# Patient Record
Sex: MALE | Race: WHITE | URBAN - METROPOLITAN AREA
[De-identification: names, ages, dates, MRNs, and addresses within clinical notes are randomized per-mention and may not be internally consistent; named-entity substitution may affect disease eponyms.]

---

## 2017-08-16 ENCOUNTER — ED HISTORICAL/CONVERTED ENCOUNTER (OUTPATIENT)
Dept: OTHER | Age: 32
End: 2017-08-16

## 2019-06-21 ENCOUNTER — ED HISTORICAL/CONVERTED ENCOUNTER (OUTPATIENT)
Dept: OTHER | Age: 34
End: 2019-06-21

## 2019-11-05 ENCOUNTER — ED HISTORICAL/CONVERTED ENCOUNTER (OUTPATIENT)
Dept: OTHER | Age: 34
End: 2019-11-05

## 2023-04-13 ENCOUNTER — HOSPITAL ENCOUNTER (EMERGENCY)
Age: 38
Discharge: HOME | End: 2023-04-13
Attending: EMERGENCY MEDICINE
Payer: MEDICAID

## 2023-04-13 VITALS
BODY MASS INDEX: 25.67 KG/M2 | TEMPERATURE: 98.1 F | OXYGEN SATURATION: 96 % | WEIGHT: 200 LBS | HEART RATE: 110 BPM | DIASTOLIC BLOOD PRESSURE: 66 MMHG | RESPIRATION RATE: 20 BRPM | HEIGHT: 74 IN | SYSTOLIC BLOOD PRESSURE: 107 MMHG

## 2023-04-13 DIAGNOSIS — R56.9 SEIZURE (HCC): Primary | ICD-10-CM

## 2023-04-13 LAB
ALBUMIN SERPL-MCNC: 3.9 G/DL (ref 3.5–5)
ALBUMIN/GLOB SERPL: 1 (ref 1.1–2.2)
ALP SERPL-CCNC: 89 U/L (ref 45–117)
ALT SERPL-CCNC: 68 U/L (ref 12–78)
ANION GAP SERPL CALC-SCNC: 28 MMOL/L (ref 5–15)
AST SERPL W P-5'-P-CCNC: 72 U/L (ref 15–37)
BASE DEFICIT BLDV-SCNC: 3.7 MMOL/L
BASOPHILS # BLD: 0.1 K/UL (ref 0–0.1)
BASOPHILS NFR BLD: 1 % (ref 0–1)
BILIRUB SERPL-MCNC: 0.9 MG/DL (ref 0.2–1)
BUN SERPL-MCNC: 16 MG/DL (ref 6–20)
BUN/CREAT SERPL: 11 (ref 12–20)
CA-I BLD-MCNC: 9.9 MG/DL (ref 8.5–10.1)
CHLORIDE SERPL-SCNC: 93 MMOL/L (ref 97–108)
CO2 SERPL-SCNC: 12 MMOL/L (ref 21–32)
CREAT SERPL-MCNC: 1.4 MG/DL (ref 0.7–1.3)
DIFFERENTIAL METHOD BLD: ABNORMAL
EOSINOPHIL # BLD: 0.1 K/UL (ref 0–0.4)
EOSINOPHIL NFR BLD: 1 % (ref 0–7)
ERYTHROCYTE [DISTWIDTH] IN BLOOD BY AUTOMATED COUNT: 12.5 % (ref 11.5–14.5)
GLOBULIN SER CALC-MCNC: 4 G/DL (ref 2–4)
GLUCOSE SERPL-MCNC: 107 MG/DL (ref 65–100)
HCO3 BLDV-SCNC: 21.3 MMOL/L (ref 22–26)
HCT VFR BLD AUTO: 46.8 % (ref 36.6–50.3)
HGB BLD-MCNC: 15.8 G/DL (ref 12.1–17)
IMM GRANULOCYTES # BLD AUTO: 0.1 K/UL (ref 0–0.04)
IMM GRANULOCYTES NFR BLD AUTO: 1 % (ref 0–0.5)
LACTATE SERPL-SCNC: 4.2 MMOL/L (ref 0.4–2)
LYMPHOCYTES # BLD: 2.5 K/UL (ref 0.8–3.5)
LYMPHOCYTES NFR BLD: 23 % (ref 12–49)
MCH RBC QN AUTO: 35.4 PG (ref 26–34)
MCHC RBC AUTO-ENTMCNC: 33.8 G/DL (ref 30–36.5)
MCV RBC AUTO: 104.9 FL (ref 80–99)
MONOCYTES # BLD: 1 K/UL (ref 0–1)
MONOCYTES NFR BLD: 9 % (ref 5–13)
NEUTS SEG # BLD: 7.2 K/UL (ref 1.8–8)
NEUTS SEG NFR BLD: 65 % (ref 32–75)
PCO2 BLDV: 37.6 MMHG (ref 41–52)
PERFORMED BY, TECHID: ABNORMAL
PH BLDV: 7.36 (ref 7.23–7.44)
PLATELET # BLD AUTO: 138 K/UL (ref 150–400)
PMV BLD AUTO: 10.4 FL (ref 8.9–12.9)
PO2 BLDV: 28 MMHG (ref 25–40)
POTASSIUM SERPL-SCNC: 3.4 MMOL/L (ref 3.5–5.1)
PROT SERPL-MCNC: 7.9 G/DL (ref 6.4–8.2)
RBC # BLD AUTO: 4.46 M/UL (ref 4.1–5.7)
SAO2 % BLDV: 51.6 %
SODIUM SERPL-SCNC: 133 MMOL/L (ref 136–145)
SPECIMEN TYPE: ABNORMAL
WBC # BLD AUTO: 11 K/UL (ref 4.1–11.1)

## 2023-04-13 PROCEDURE — 74011250636 HC RX REV CODE- 250/636: Performed by: EMERGENCY MEDICINE

## 2023-04-13 PROCEDURE — 74011250637 HC RX REV CODE- 250/637: Performed by: EMERGENCY MEDICINE

## 2023-04-13 PROCEDURE — 93005 ELECTROCARDIOGRAM TRACING: CPT

## 2023-04-13 PROCEDURE — 83605 ASSAY OF LACTIC ACID: CPT

## 2023-04-13 PROCEDURE — 80053 COMPREHEN METABOLIC PANEL: CPT

## 2023-04-13 PROCEDURE — 85025 COMPLETE CBC W/AUTO DIFF WBC: CPT

## 2023-04-13 PROCEDURE — 99284 EMERGENCY DEPT VISIT MOD MDM: CPT

## 2023-04-13 PROCEDURE — 36415 COLL VENOUS BLD VENIPUNCTURE: CPT

## 2023-04-13 PROCEDURE — 96374 THER/PROPH/DIAG INJ IV PUSH: CPT

## 2023-04-13 PROCEDURE — 96361 HYDRATE IV INFUSION ADD-ON: CPT

## 2023-04-13 RX ORDER — PHENYTOIN 50 MG/1
100 TABLET, CHEWABLE ORAL 3 TIMES DAILY
Qty: 180 TABLET | Refills: 2 | Status: SHIPPED | OUTPATIENT
Start: 2023-04-13 | End: 2023-05-13

## 2023-04-13 RX ORDER — IBUPROFEN 600 MG/1
600 TABLET ORAL
Status: DISCONTINUED | OUTPATIENT
Start: 2023-04-13 | End: 2023-04-13 | Stop reason: HOSPADM

## 2023-04-13 RX ORDER — LEVETIRACETAM 500 MG/5ML
1000 INJECTION, SOLUTION, CONCENTRATE INTRAVENOUS EVERY 12 HOURS
Status: DISCONTINUED | OUTPATIENT
Start: 2023-04-13 | End: 2023-04-13 | Stop reason: HOSPADM

## 2023-04-13 RX ADMIN — SODIUM CHLORIDE 1000 ML: 9 INJECTION, SOLUTION INTRAVENOUS at 13:39

## 2023-04-13 RX ADMIN — SODIUM CHLORIDE 1000 ML: 9 INJECTION, SOLUTION INTRAVENOUS at 15:04

## 2023-04-13 RX ADMIN — IBUPROFEN 600 MG: 600 TABLET, FILM COATED ORAL at 16:09

## 2023-04-13 RX ADMIN — LEVETIRACETAM 1000 MG: 100 INJECTION, SOLUTION INTRAVENOUS at 13:56

## 2023-04-13 NOTE — ED PROVIDER NOTES
[facility] EMERGENCY DEPARTMENT  EMERGENCY DEPARTMENT HISTORY AND PHYSICAL EXAM      Date: 4/13/2023  Patient Name: Geri Perales  MRN: [id number]  [place] 1985  Date of evaluation: 4/13/2023  Provider: Kacy Perez DO   Note Started: 1:01 PM 4/13/23    History of Presenting Illness     Chief Complaint   Patient presents with    Seizure     History Provided By: Patient and EMS    HPI: Geri Perales is a 42 y.o. male with history of seizure who presents with seizure-like activity. States he was at work. He was in a car with coworkers. He does not remember what happened after that. He was told by his coworkers that he had a 2-minute seizure. Patient feeling well now. No fevers. He is supposed be on Dilantin but does not have the prescription and has not been taking it. Past History     Past Medical History:  Seizures    Past Surgical History:  History reviewed. No pertinent surgical history. Family History:  History reviewed. No pertinent family history. Social History:  Social History     Tobacco Use    Smoking status: Every Day     Packs/day: 0.50     Types: Cigarettes    Smokeless tobacco: Never   Substance Use Topics    Alcohol use: Yes     Comment: beer    Drug use: Yes     Types: Marijuana     Comment: daily       Allergies:  No Known Allergies    PCP: None    Current Meds:   Previous Medications    No medications on file       Physical Exam   Vitals  ED Triage Vitals   ED Encounter Vitals Group      BP       Pulse       Resp       Temp       Temp src       SpO2       Weight       Height       Exam  Constitutional: No acute distress. Well-nourished. Skin: No rash. ENT: No rhinorrhea. No cough. Head is normocephalic and atraumatic. Eye: No proptosis or conjunctival injections. Respiratory: No apparent respiratory distress. Cardiac: Tachycardic with regular rhythm. 2+ radial pulses. No murmurs. Neurological: No focal deficits. Gastrointestinal: Nondistended.   Musculoskeletal: No obvious bony deformities. Psychiatric: Cooperative. Appropriate mood and affect. SCREENINGS               No data recorded        Lab and Diagnostic Study Results   Labs -     Recent Results (from the past 12 hour(s))   METABOLIC PANEL, COMPREHENSIVE    Collection Time: 04/13/23  1:20 PM   Result Value Ref Range    Sodium 133 (L) 136 - 145 mmol/L    Potassium 3.4 (L) 3.5 - 5.1 mmol/L    Chloride 93 (L) 97 - 108 mmol/L    CO2 12 (LL) 21 - 32 mmol/L    Anion gap 28 (H) 5 - 15 mmol/L    Glucose 107 (H) 65 - 100 mg/dL    BUN 16 6 - 20 mg/dL    Creatinine 1.40 (H) 0.70 - 1.30 mg/dL    BUN/Creatinine ratio 11 (L) 12 - 20      eGFR >60 >60 ml/min/1.73m2    Calcium 9.9 8.5 - 10.1 mg/dL    Bilirubin, total 0.9 0.2 - 1.0 mg/dL    AST (SGOT) 72 (H) 15 - 37 U/L    ALT (SGPT) 68 12 - 78 U/L    Alk. phosphatase 89 45 - 117 U/L    Protein, total 7.9 6.4 - 8.2 g/dL    Albumin 3.9 3.5 - 5.0 g/dL    Globulin 4.0 2.0 - 4.0 g/dL    A-G Ratio 1.0 (L) 1.1 - 2.2     CBC WITH AUTOMATED DIFF    Collection Time: 04/13/23  1:20 PM   Result Value Ref Range    WBC 11.0 4.1 - 11.1 K/uL    RBC 4.46 4.10 - 5.70 M/uL    HGB 15.8 12.1 - 17.0 g/dL    HCT 46.8 36.6 - 50.3 %    .9 (H) 80.0 - 99.0 FL    MCH 35.4 (H) 26.0 - 34.0 PG    MCHC 33.8 30.0 - 36.5 g/dL    RDW 12.5 11.5 - 14.5 %    PLATELET 444 (L) 678 - 400 K/uL    MPV 10.4 8.9 - 12.9 FL    NEUTROPHILS 65 32 - 75 %    LYMPHOCYTES 23 12 - 49 %    MONOCYTES 9 5 - 13 %    EOSINOPHILS 1 0 - 7 %    BASOPHILS 1 0 - 1 %    IMMATURE GRANULOCYTES 1 (H) 0.0 - 0.5 %    ABS. NEUTROPHILS 7.2 1.8 - 8.0 K/UL    ABS. LYMPHOCYTES 2.5 0.8 - 3.5 K/UL    ABS. MONOCYTES 1.0 0.0 - 1.0 K/UL    ABS. EOSINOPHILS 0.1 0.0 - 0.4 K/UL    ABS. BASOPHILS 0.1 0.0 - 0.1 K/UL    ABS. IMM.  GRANS. 0.1 (H) 0.00 - 0.04 K/UL    DF AUTOMATED     LACTIC ACID    Collection Time: 04/13/23  2:50 PM   Result Value Ref Range    Lactic acid 4.2 (HH) 0.4 - 2.0 mmol/L   POC VENOUS BLOOD GAS    Collection Time: 04/13/23  3:34 PM Result Value Ref Range    pH, venous (POC) 7.36 7.23 - 7.44      pCO2, venous (POC) 37.6 (L) 41.0 - 52.0 mmHg    pO2, venous (POC) 28 25 - 40 mmHg    HCO3, venous (POC) 21.3 (L) 22.0 - 26.0 mmol/L    sO2, venous (POC) 51.6 %    Base deficit, venous (POC) 3.7 mmol/L    Specimen type (POC) Venous      Performed by Chrystal Casillas        EKG: Initial EKG interpreted by me. Interpretation: Obtained on 4/13/2023 at 1335. Read at 1340. Sinus tachycardia at rate of 104 bpm.  Normal NC interval, QRS duration, QTc interval.  No ST segment abnormalities. Normal axis. Radiologic Studies:  Non-plain film images such as CT, Ultrasound and MRI are read by the radiologist. Plain radiographic images are visualized and preliminarily interpreted by the ED Provider with the below findings:  N/a    Interpretation per the radiologist below, if available at the time of this note:  No results found. MDM (EMERGENCY DEPARTMENT COURSE and DIFFERENTIAL DIAGNOSIS)   Vitals:    Vitals:    04/13/23 1303 04/13/23 1601   BP: (!) 87/62 107/66   Pulse: (!) 125 (!) 110   Resp: 20    Temp: 98.1 °F (36.7 °C)    SpO2: 91% 96%   Weight: 90.7 kg (200 lb)    Height: 6' 2\" (1.88 m)         Patient was given the following medications:  Medications   levETIRAcetam (KEPPRA) injection 1,000 mg (1,000 mg IntraVENous Given 4/13/23 1356)   ibuprofen (MOTRIN) tablet 600 mg (600 mg Oral Given 4/13/23 1609)   sodium chloride 0.9 % bolus infusion 1,000 mL (0 mL IntraVENous IV Completed 4/13/23 1500)   sodium chloride 0.9 % bolus infusion 1,000 mL (0 mL IntraVENous IV Completed 4/13/23 1548)       CONSULTS: (who and what was discussed)  None       Social Determinants affecting Dx or Tx:   Counseling:     Records Reviewed (source and summary of external notes): Nursing notes. CC/HPI Summary: Seizure-like activity  DDx: Seizure, syncope, orthostatic hypotension, arrhythmia  ED Course and Reassessment:   Patient likely had a seizure based on history.   I have ordered 1 g of Keppra IV and a liter of normal saline. I am concerned about his vital signs. He is tachycardic and hypotensive. This likely could be from dehydration. He does work outside. Currently not concerned about sepsis. ED Course as of 04/13/23 1614   Thu Apr 13, 2023   1444 CO2 is 12. I am concerned about this number. I have ordered a lactic acid venous blood gas as well as another liter of normal saline. [SW]      ED Course User Index  [SW] Josué NORIEGA, DO     Patient pH is appropriate. Lactic acid is 4 however he did likely have a seizure with staff explaining this elevation. Heart rate is 110 currently after liter of normal saline. He was given a gram of Keppra and does not have any seizure activity while he is here. I feel it is safe to discharge this patient home. I am not concerned about sepsis clinically. Blood pressure has improved significantly with IV fluid. He did have some back pain which is likely related to muscle spasms from seizure. He was given Motrin for this. No red flag signs and I feel no need for any further work-up at this time. CRITICAL CARE TIME   CRITICAL CARE NOTE :  CRITICAL CARE:  I personally spent a total of 60 minutes of critical care time in obtaining history, performing a physical exam, bedside monitoring of interventions, collecting and interpreting tests but excluding time spent performing procedures, treating other patients and teaching time. Discussion with consultant: n/a. Clinical Concern: hypotension. Intervention: iv fluids  Vanita [place] D.O. Mar Hernandez,      Disposition/Plan   Disposition: Discharged    DISCHARGE PLAN:  1. There are no discharge medications for this patient.     2.   Follow-up Information       Follow up With Specialties Details Why Contact Info    [address] [facility] Emergency Medicine Go today As soon as possible if symptoms worsen [address]  [phone] 3.  Return to ED if worse   4. Current Discharge Medication List        START taking these medications    Details   phenytoin (DILANTIN) 50 mg chewable tablet Take 2 Tablets by mouth three (3) times daily for 30 days. Qty: 180 Tablet, Refills: 2  Start date: 4/13/2023, End date: 5/13/2023              PATIENT REFERRED TO:  Follow-up Information       Follow up With Specialties Details Why Contact Info    [address] [place] [address] Emergency Medicine Go today As soon as possible if symptoms worsen [address] [place] [id number] [phone]             DISCONTINUED MEDICATIONS:  Current Discharge Medication List        Clinical Impression   Clinical Impression:   1. Seizure ([facility] [address]           Lucy Mancuso DO    I am the Primary Clinician of Record. Lucy Mancuso DO (electronically signed)    (Please note that parts of this dictation were completed with voice recognition software. Quite often unanticipated grammatical, syntax, homophones, and other interpretive errors are inadvertently transcribed by the computer software. Please disregards these errors.  Please excuse any errors that have escaped final proofreading.)

## 2023-04-13 NOTE — ED TRIAGE NOTES
Pt has hx of seizures, had a witnessed seizure for approx 2 min per friends. Pt has had a few beers and some weed today.

## 2023-04-14 LAB
ATRIAL RATE: 104 BPM
CALCULATED P AXIS, ECG09: 52 DEGREES
CALCULATED R AXIS, ECG10: 37 DEGREES
CALCULATED T AXIS, ECG11: 48 DEGREES
DIAGNOSIS, 93000: NORMAL
P-R INTERVAL, ECG05: 148 MS
Q-T INTERVAL, ECG07: 370 MS
QRS DURATION, ECG06: 86 MS
QTC CALCULATION (BEZET), ECG08: 486 MS
VENTRICULAR RATE, ECG03: 104 BPM

## 2023-11-06 ENCOUNTER — HOSPITAL ENCOUNTER (EMERGENCY)
Facility: HOSPITAL | Age: 38
Discharge: HOME | End: 2023-11-06
Payer: MEDICAID

## 2023-11-06 ENCOUNTER — APPOINTMENT (OUTPATIENT)
Facility: HOSPITAL | Age: 38
End: 2023-11-06
Payer: MEDICAID

## 2023-11-06 VITALS
BODY MASS INDEX: 27.83 KG/M2 | WEIGHT: 210 LBS | OXYGEN SATURATION: 95 % | HEIGHT: 73 IN | RESPIRATION RATE: 17 BRPM | TEMPERATURE: 98 F | HEART RATE: 82 BPM | DIASTOLIC BLOOD PRESSURE: 89 MMHG | SYSTOLIC BLOOD PRESSURE: 149 MMHG

## 2023-11-06 DIAGNOSIS — S61.210A LACERATION OF RIGHT INDEX FINGER WITHOUT FOREIGN BODY WITHOUT DAMAGE TO NAIL, INITIAL ENCOUNTER: Primary | ICD-10-CM

## 2023-11-06 PROCEDURE — 90471 IMMUNIZATION ADMIN: CPT | Performed by: NURSE PRACTITIONER

## 2023-11-06 PROCEDURE — 73130 X-RAY EXAM OF HAND: CPT

## 2023-11-06 PROCEDURE — 99284 EMERGENCY DEPT VISIT MOD MDM: CPT

## 2023-11-06 PROCEDURE — 6360000002 HC RX W HCPCS: Performed by: NURSE PRACTITIONER

## 2023-11-06 PROCEDURE — 90714 TD VACC NO PRESV 7 YRS+ IM: CPT | Performed by: NURSE PRACTITIONER

## 2023-11-06 PROCEDURE — 12001 RPR S/N/AX/GEN/TRNK 2.5CM/<: CPT

## 2023-11-06 RX ORDER — TETANUS AND DIPHTHERIA TOXOIDS ADSORBED 2; 2 [LF]/.5ML; [LF]/.5ML
0.5 INJECTION INTRAMUSCULAR ONCE
Status: COMPLETED | OUTPATIENT
Start: 2023-11-06 | End: 2023-11-06

## 2023-11-06 RX ADMIN — TETANUS AND DIPHTHERIA TOXOIDS ADSORBED 0.5 ML: 2; 2 INJECTION INTRAMUSCULAR at 08:23

## 2023-11-06 ASSESSMENT — PAIN SCALES - GENERAL: PAINLEVEL_OUTOF10: 0

## 2023-11-06 ASSESSMENT — PAIN - FUNCTIONAL ASSESSMENT: PAIN_FUNCTIONAL_ASSESSMENT: 0-10

## 2023-11-06 NOTE — ED TRIAGE NOTES
Pt with laceration to right second digit yesterday. Cut it with a razor blade while working on a roof.  Unsure tetanus vaccine status

## 2023-11-06 NOTE — ED PROVIDER NOTES
[facility] EMERGENCY DEPT  EMERGENCY DEPARTMENT HISTORY AND PHYSICAL EXAM      Date: 11/6/2023  Patient Name: Tal Rangel  MRN: [id number]  [address]: 1985  Date of evaluation: 11/6/2023  Provider: HECTOR Bravo NP   Note Started: 8:52 AM EST 11/6/23    HISTORY OF PRESENT ILLNESS     Chief Complaint   Patient presents with    Laceration       History Provided By: Patient    HPI: Tal Rangel is a 34 y.o. male who has a past medical history as listed below presents to the ER with laceration. Patient has a laceration on his index finger. Patient cut it with a razor blade yesterday at approximately 4:00. Patient unsure of last tetanus. PAST MEDICAL HISTORY   Past Medical History:  Past Medical History:   Diagnosis Date    Seizures ([address])        Past Surgical History:  History reviewed. No pertinent surgical history. Family History:  History reviewed. No pertinent family history. Social History:  Social History     Tobacco Use    Smoking status: Every Day     Packs/day: .5     Types: Cigarettes    Smokeless tobacco: Never   Substance Use Topics    Alcohol use: Yes     Comment: weekends    Drug use: Yes     Types: Marijuana Lana Risen)       Allergies:  No Known Allergies    PCP: No primary care provider on file. Current Meds:   No current facility-administered medications for this encounter. No current outpatient medications on file.        Social Determinants of Health:   Social Determinants of Health     Tobacco Use: High Risk (11/6/2023)    Patient History     Smoking Tobacco Use: Every Day     Smokeless Tobacco Use: Never     Passive Exposure: Not on file   Alcohol Use: Not on file   Financial Resource Strain: Not on file   Food Insecurity: Not on file   Transportation Needs: Not on file   Physical Activity: Not on file   Stress: Not on file   Social Connections: Not on file   Intimate Partner Violence: Not on file   Depression: Not on file   Housing Stability: Not on file   Interpersonal

## 2023-12-27 ENCOUNTER — HOSPITAL ENCOUNTER (EMERGENCY)
Facility: HOSPITAL | Age: 38
Discharge: HOME | End: 2023-12-27
Attending: EMERGENCY MEDICINE
Payer: MEDICAID

## 2023-12-27 VITALS
WEIGHT: 200 LBS | BODY MASS INDEX: 28 KG/M2 | HEIGHT: 71 IN | DIASTOLIC BLOOD PRESSURE: 82 MMHG | HEART RATE: 82 BPM | TEMPERATURE: 98.4 F | SYSTOLIC BLOOD PRESSURE: 161 MMHG | RESPIRATION RATE: 16 BRPM | OXYGEN SATURATION: 97 %

## 2023-12-27 DIAGNOSIS — L72.9 CYST OF SKIN: Primary | ICD-10-CM

## 2023-12-27 PROCEDURE — 99282 EMERGENCY DEPT VISIT SF MDM: CPT

## 2023-12-27 NOTE — ED PROVIDER NOTES
[facility] EMERGENCY DEPT  EMERGENCY DEPARTMENT HISTORY AND PHYSICAL EXAM      Date: 12/27/2023  Patient Name: Promise Ivan  MRN: [id number]  [address] [place] 1985  Date of evaluation: 12/27/2023  Provider: Guy Mohan MD   Note Started: 1:48 PM EST 12/27/23    HISTORY OF PRESENT ILLNESS     Chief Complaint   Patient presents with    Mass       History Provided By: Patient    HPI: Promise Ivan is a 34 y.o. male presents emergency room for evaluation of a lump under his chin that has been there for proxy 1 year. Patient reports that this slowly gotten larger. Patient denies pain complaint. Patient denies any redness, denies fevers or chills, denies any difficulty eating or swallowing. Patient states he also wants to have a form signed and states he is medically cleared to donate plasma, states he does not have a primary care physician. PAST MEDICAL HISTORY   Past Medical History:  Past Medical History:   Diagnosis Date    Seizures ([address])        Past Surgical History:  No past surgical history on file. Family History:  No family history on file. Social History:  Social History     Tobacco Use    Smoking status: Every Day     Current packs/day: 0.50     Types: Cigarettes    Smokeless tobacco: Never   Substance Use Topics    Alcohol use: Yes     Comment: weekends    Drug use: Yes     Types: Marijuana Hailey Rival)       Allergies:  No Known Allergies    PCP: No primary care provider on file. Current Meds:   No current facility-administered medications for this encounter. No current outpatient medications on file.        Social Determinants of Health:   Social Determinants of Health     Tobacco Use: High Risk (11/6/2023)    Patient History     Smoking Tobacco Use: Every Day     Smokeless Tobacco Use: Never     Passive Exposure: Not on file   Alcohol Use: Not on file   Financial Resource Strain: Not on file   Food Insecurity: Not on file   Transportation Needs: Not on file   Physical Activity: Not on

## 2024-05-07 ENCOUNTER — HOSPITAL ENCOUNTER (EMERGENCY)
Facility: HOSPITAL | Age: 39
Discharge: HOME | End: 2024-05-07
Attending: EMERGENCY MEDICINE
Payer: MEDICAID

## 2024-05-07 VITALS
HEIGHT: 73 IN | RESPIRATION RATE: 18 BRPM | HEART RATE: 98 BPM | TEMPERATURE: 98.4 F | BODY MASS INDEX: 30.48 KG/M2 | DIASTOLIC BLOOD PRESSURE: 91 MMHG | OXYGEN SATURATION: 100 % | WEIGHT: 230 LBS | SYSTOLIC BLOOD PRESSURE: 141 MMHG

## 2024-05-07 DIAGNOSIS — Z79.899 SEIZURE SECONDARY TO SUBTHERAPEUTIC ANTICONVULSANT MEDICATION (HCC): ICD-10-CM

## 2024-05-07 DIAGNOSIS — R56.9 SEIZURE (HCC): Primary | ICD-10-CM

## 2024-05-07 DIAGNOSIS — R56.9 SEIZURE SECONDARY TO SUBTHERAPEUTIC ANTICONVULSANT MEDICATION (HCC): ICD-10-CM

## 2024-05-07 LAB
ALBUMIN SERPL-MCNC: 3.7 G/DL (ref 3.5–5)
ALBUMIN/GLOB SERPL: 1 (ref 1.1–2.2)
ALP SERPL-CCNC: 71 U/L (ref 45–117)
ALT SERPL-CCNC: 93 U/L (ref 12–78)
ANION GAP SERPL CALC-SCNC: 14 MMOL/L (ref 5–15)
AST SERPL W P-5'-P-CCNC: 124 U/L (ref 15–37)
BASOPHILS # BLD: 0.1 K/UL (ref 0–0.1)
BASOPHILS NFR BLD: 1 % (ref 0–1)
BILIRUB SERPL-MCNC: 1 MG/DL (ref 0.2–1)
BUN SERPL-MCNC: 12 MG/DL (ref 6–20)
BUN/CREAT SERPL: 11 (ref 12–20)
CA-I BLD-MCNC: 9.5 MG/DL (ref 8.5–10.1)
CHLORIDE SERPL-SCNC: 102 MMOL/L (ref 97–108)
CO2 SERPL-SCNC: 19 MMOL/L (ref 21–32)
CREAT SERPL-MCNC: 1.14 MG/DL (ref 0.7–1.3)
DIFFERENTIAL METHOD BLD: ABNORMAL
EOSINOPHIL # BLD: 0.1 K/UL (ref 0–0.4)
EOSINOPHIL NFR BLD: 1 % (ref 0–7)
ERYTHROCYTE [DISTWIDTH] IN BLOOD BY AUTOMATED COUNT: 13.7 % (ref 11.5–14.5)
ETHANOL SERPL-MCNC: <10 MG/DL (ref 0–0.08)
GLOBULIN SER CALC-MCNC: 3.7 G/DL (ref 2–4)
GLUCOSE SERPL-MCNC: 149 MG/DL (ref 65–100)
HCT VFR BLD AUTO: 44 % (ref 36.6–50.3)
HGB BLD-MCNC: 15.1 G/DL (ref 12.1–17)
IMM GRANULOCYTES # BLD AUTO: 0 K/UL (ref 0–0.04)
IMM GRANULOCYTES NFR BLD AUTO: 1 % (ref 0–0.5)
LYMPHOCYTES # BLD: 1.6 K/UL (ref 0.8–3.5)
LYMPHOCYTES NFR BLD: 22 % (ref 12–49)
MAGNESIUM SERPL-MCNC: 1.8 MG/DL (ref 1.6–2.4)
MCH RBC QN AUTO: 34.2 PG (ref 26–34)
MCHC RBC AUTO-ENTMCNC: 34.3 G/DL (ref 30–36.5)
MCV RBC AUTO: 99.5 FL (ref 80–99)
MONOCYTES # BLD: 1 K/UL (ref 0–1)
MONOCYTES NFR BLD: 14 % (ref 5–13)
NEUTS SEG # BLD: 4.4 K/UL (ref 1.8–8)
NEUTS SEG NFR BLD: 61 % (ref 32–75)
NRBC # BLD: 0 K/UL (ref 0–0.01)
NRBC BLD-RTO: 0 PER 100 WBC
PLATELET # BLD AUTO: 158 K/UL (ref 150–400)
PMV BLD AUTO: 10.4 FL (ref 8.9–12.9)
POTASSIUM SERPL-SCNC: 3.6 MMOL/L (ref 3.5–5.1)
PROT SERPL-MCNC: 7.4 G/DL (ref 6.4–8.2)
RBC # BLD AUTO: 4.42 M/UL (ref 4.1–5.7)
SODIUM SERPL-SCNC: 135 MMOL/L (ref 136–145)
WBC # BLD AUTO: 7.1 K/UL (ref 4.1–11.1)

## 2024-05-07 PROCEDURE — 99284 EMERGENCY DEPT VISIT MOD MDM: CPT

## 2024-05-07 PROCEDURE — 80053 COMPREHEN METABOLIC PANEL: CPT

## 2024-05-07 PROCEDURE — 85025 COMPLETE CBC W/AUTO DIFF WBC: CPT

## 2024-05-07 PROCEDURE — 96361 HYDRATE IV INFUSION ADD-ON: CPT

## 2024-05-07 PROCEDURE — 83735 ASSAY OF MAGNESIUM: CPT

## 2024-05-07 PROCEDURE — 6360000002 HC RX W HCPCS: Performed by: EMERGENCY MEDICINE

## 2024-05-07 PROCEDURE — 36415 COLL VENOUS BLD VENIPUNCTURE: CPT

## 2024-05-07 PROCEDURE — 82077 ASSAY SPEC XCP UR&BREATH IA: CPT

## 2024-05-07 PROCEDURE — 96374 THER/PROPH/DIAG INJ IV PUSH: CPT

## 2024-05-07 PROCEDURE — 6370000000 HC RX 637 (ALT 250 FOR IP): Performed by: EMERGENCY MEDICINE

## 2024-05-07 PROCEDURE — 2580000003 HC RX 258: Performed by: EMERGENCY MEDICINE

## 2024-05-07 RX ORDER — GAUZE BANDAGE 2" X 2"
100 BANDAGE TOPICAL ONCE
Status: COMPLETED | OUTPATIENT
Start: 2024-05-07 | End: 2024-05-07

## 2024-05-07 RX ORDER — BACITRACIN ZINC 500 [USP'U]/G
OINTMENT TOPICAL
Status: COMPLETED | OUTPATIENT
Start: 2024-05-07 | End: 2024-05-07

## 2024-05-07 RX ORDER — LEVETIRACETAM 500 MG/1
500 TABLET ORAL 2 TIMES DAILY
Qty: 60 TABLET | Refills: 0 | Status: SHIPPED | OUTPATIENT
Start: 2024-05-07 | End: 2024-06-06

## 2024-05-07 RX ORDER — FOLIC ACID 1 MG/1
1 TABLET ORAL
Status: COMPLETED | OUTPATIENT
Start: 2024-05-07 | End: 2024-05-07

## 2024-05-07 RX ORDER — 0.9 % SODIUM CHLORIDE 0.9 %
1000 INTRAVENOUS SOLUTION INTRAVENOUS ONCE
Status: COMPLETED | OUTPATIENT
Start: 2024-05-07 | End: 2024-05-07

## 2024-05-07 RX ORDER — LEVETIRACETAM 500 MG/5ML
1000 INJECTION, SOLUTION, CONCENTRATE INTRAVENOUS ONCE
Status: COMPLETED | OUTPATIENT
Start: 2024-05-07 | End: 2024-05-07

## 2024-05-07 RX ADMIN — FOLIC ACID 1 MG: 1 TABLET ORAL at 11:21

## 2024-05-07 RX ADMIN — BACITRACIN ZINC: 500 OINTMENT TOPICAL at 11:21

## 2024-05-07 RX ADMIN — SODIUM CHLORIDE 1000 ML: 9 INJECTION, SOLUTION INTRAVENOUS at 11:21

## 2024-05-07 RX ADMIN — THIAMINE HCL TAB 100 MG 100 MG: 100 TAB at 11:21

## 2024-05-07 RX ADMIN — LEVETIRACETAM 1000 MG: 100 INJECTION, SOLUTION INTRAVENOUS at 10:43

## 2024-05-07 ASSESSMENT — PAIN - FUNCTIONAL ASSESSMENT
PAIN_FUNCTIONAL_ASSESSMENT: 0-10
PAIN_FUNCTIONAL_ASSESSMENT: 0-10

## 2024-05-07 ASSESSMENT — PAIN SCALES - GENERAL
PAINLEVEL_OUTOF10: 8
PAINLEVEL_OUTOF10: 1

## 2024-05-07 ASSESSMENT — LIFESTYLE VARIABLES
HOW MANY STANDARD DRINKS CONTAINING ALCOHOL DO YOU HAVE ON A TYPICAL DAY: 5 OR 6
HOW OFTEN DO YOU HAVE A DRINK CONTAINING ALCOHOL: 4 OR MORE TIMES A WEEK

## 2024-05-07 NOTE — DISCHARGE INSTRUCTIONS
No driving for 6 months or until cleared by neurology to return.  Follow-up with one of the neurologist on your paperwork.  Return for further concerns.  I was unable to verify your Dilantin dosing so I prescribed Keppra which is a more typical seizure medication.         Thank you!  Thank you for allowing me to care for you in the emergency department. It is my goal to provide you with excellent care.  Please fill out the survey that will come to you by mail or email since we listen to your feedback!     Below you will find a list of your tests from today's visit.  Should you have any questions, please do not hesitate to call the emergency department.    Labs  Recent Results (from the past 12 hour(s))   CBC with Auto Differential    Collection Time: 05/07/24  9:50 AM   Result Value Ref Range    WBC 7.1 4.1 - 11.1 K/uL    RBC 4.42 4.10 - 5.70 M/uL    Hemoglobin 15.1 12.1 - 17.0 g/dL    Hematocrit 44.0 36.6 - 50.3 %    MCV 99.5 (H) 80.0 - 99.0 FL    MCH 34.2 (H) 26.0 - 34.0 PG    MCHC 34.3 30.0 - 36.5 g/dL    RDW 13.7 11.5 - 14.5 %    Platelets 158 150 - 400 K/uL    MPV 10.4 8.9 - 12.9 FL    Nucleated RBCs 0.0 0.0  WBC    nRBC 0.00 0.00 - 0.01 K/uL    Neutrophils % 61 32 - 75 %    Lymphocytes % 22 12 - 49 %    Monocytes % 14 (H) 5 - 13 %    Eosinophils % 1 0 - 7 %    Basophils % 1 0 - 1 %    Immature Granulocytes % 1 (H) 0 - 0.5 %    Neutrophils Absolute 4.4 1.8 - 8.0 K/UL    Lymphocytes Absolute 1.6 0.8 - 3.5 K/UL    Monocytes Absolute 1.0 0.0 - 1.0 K/UL    Eosinophils Absolute 0.1 0.0 - 0.4 K/UL    Basophils Absolute 0.1 0.0 - 0.1 K/UL    Immature Granulocytes Absolute 0.0 0.00 - 0.04 K/UL    Differential Type AUTOMATED     CMP    Collection Time: 05/07/24  9:50 AM   Result Value Ref Range    Sodium 135 (L) 136 - 145 mmol/L    Potassium 3.6 3.5 - 5.1 mmol/L    Chloride 102 97 - 108 mmol/L    CO2 19 (L) 21 - 32 mmol/L    Anion Gap 14 5 - 15 mmol/L    Glucose 149 (H) 65 - 100 mg/dL    BUN 12 6 - 20 mg/dL

## 2024-05-07 NOTE — ED TRIAGE NOTES
Pt called ems for seizures, friends said they were walking down the sidewalk he had a seizure and scraped his right knee, possibly had one yesterday and has not been taking meds recently. Pt has HTN, glucose 141

## 2024-05-07 NOTE — ED PROVIDER NOTES
[facility] EMERGENCY DEPT  EMERGENCY DEPARTMENT HISTORY AND PHYSICAL EXAM      Date: 5/7/2024  Patient Name: Delvis Galindo      History of Presenting Illness       Chief Complaint   Patient presents with    Seizures       History was provided by: Patient    Location/Duration/Severity/Modifying factors     Delvis Galindo is a 38 y.o. male who arrived to the emergency department by by EMS where they received No additional treatment with complaints of Seizures        Patient is a 38-year-old male who presents from home after reports of a seizure.  Patient says he was walking to work, that was the last thing he remembered.  Reportedly found having a seizure.  Sustained abrasions to both lower extremities and feet.  He says that he has not had a seizure in about 6 months.  Says he drinks alcohol on a regular basis although he says only 1-2 drinks per day.  He is not on any medication, says that he previously was on Dilantin.  Has not seen neurology in several months.  Has not been on medication in several months.  Denies any recent illness, unsure if he does have to have no headache or complaints of head pain or any other symptoms.          There are no other complaints, changes, or physical findings at this time.    PCP: None, None    No current facility-administered medications for this encounter.     Current Outpatient Medications   Medication Sig Dispense Refill    levETIRAcetam (KEPPRA) 500 MG tablet Take 1 tablet by mouth 2 times daily 60 tablet 0       Past History     Past Medical History:  Past Medical History:   Diagnosis Date    Seizures ([facility])        Past Surgical History:  No past surgical history on file.    Family History:  No family history on file.    Social History:  Social History     Tobacco Use    Smoking status: Every Day     Current packs/day: 0.50     Types: Cigarettes    Smokeless tobacco: Never   Substance Use Topics    Alcohol use: Yes     Comment: weekends    Drug use: Yes     Types: Marijuana

## 2024-05-20 ENCOUNTER — TELEPHONE (OUTPATIENT)
Age: 39
End: 2024-05-20

## 2024-05-20 NOTE — TELEPHONE ENCOUNTER
ER follow up referral received from [facility] to be seen for Seizures. I tried calling the patient but his phone isn't accepting calls at this time.

## 2025-07-05 ENCOUNTER — HOSPITAL ENCOUNTER (EMERGENCY)
Facility: HOSPITAL | Age: 40
Discharge: HOME | End: 2025-07-05
Attending: EMERGENCY MEDICINE
Payer: MEDICAID

## 2025-07-05 ENCOUNTER — APPOINTMENT (OUTPATIENT)
Facility: HOSPITAL | Age: 40
End: 2025-07-05
Payer: MEDICAID

## 2025-07-05 VITALS
HEIGHT: 73 IN | OXYGEN SATURATION: 98 % | TEMPERATURE: 97.9 F | BODY MASS INDEX: 26.51 KG/M2 | RESPIRATION RATE: 18 BRPM | SYSTOLIC BLOOD PRESSURE: 157 MMHG | HEART RATE: 84 BPM | DIASTOLIC BLOOD PRESSURE: 91 MMHG | WEIGHT: 200 LBS

## 2025-07-05 DIAGNOSIS — L72.3 INFECTED SEBACEOUS CYST: ICD-10-CM

## 2025-07-05 DIAGNOSIS — R53.83 FATIGUE, UNSPECIFIED TYPE: Primary | ICD-10-CM

## 2025-07-05 DIAGNOSIS — L03.221 CELLULITIS OF NECK: ICD-10-CM

## 2025-07-05 DIAGNOSIS — L08.9 INFECTED SEBACEOUS CYST: ICD-10-CM

## 2025-07-05 LAB
ALBUMIN SERPL-MCNC: 3.7 G/DL (ref 3.5–5)
ALBUMIN/GLOB SERPL: 1 (ref 1.1–2.2)
ALP SERPL-CCNC: 70 U/L (ref 45–117)
ALT SERPL-CCNC: 66 U/L (ref 12–78)
ANION GAP SERPL CALC-SCNC: 11 MMOL/L (ref 2–12)
APPEARANCE UR: CLEAR
AST SERPL W P-5'-P-CCNC: 83 U/L (ref 15–37)
BACTERIA URNS QL MICRO: NEGATIVE /HPF
BASOPHILS # BLD: 0.04 K/UL (ref 0–0.1)
BASOPHILS NFR BLD: 0.5 % (ref 0–1)
BILIRUB SERPL-MCNC: 0.6 MG/DL (ref 0.2–1)
BILIRUB UR QL: NEGATIVE
BUN SERPL-MCNC: 9 MG/DL (ref 6–20)
BUN/CREAT SERPL: 13 (ref 12–20)
CA-I BLD-MCNC: 8.8 MG/DL (ref 8.5–10.1)
CHLORIDE SERPL-SCNC: 102 MMOL/L (ref 97–108)
CO2 SERPL-SCNC: 26 MMOL/L (ref 21–32)
COLOR UR: ABNORMAL
CREAT SERPL-MCNC: 0.71 MG/DL (ref 0.7–1.3)
DIFFERENTIAL METHOD BLD: ABNORMAL
EOSINOPHIL # BLD: 0.13 K/UL (ref 0–0.4)
EOSINOPHIL NFR BLD: 1.7 % (ref 0–7)
EPITH CASTS URNS QL MICRO: ABNORMAL /LPF
ERYTHROCYTE [DISTWIDTH] IN BLOOD BY AUTOMATED COUNT: 13.7 % (ref 11.5–14.5)
GLOBULIN SER CALC-MCNC: 3.8 G/DL (ref 2–4)
GLUCOSE SERPL-MCNC: 93 MG/DL (ref 65–100)
GLUCOSE UR STRIP.AUTO-MCNC: NEGATIVE MG/DL
HCT VFR BLD AUTO: 47.1 % (ref 36.6–50.3)
HGB BLD-MCNC: 16.1 G/DL (ref 12.1–17)
HGB UR QL STRIP: ABNORMAL
IMM GRANULOCYTES # BLD AUTO: 0.01 K/UL (ref 0–0.04)
IMM GRANULOCYTES NFR BLD AUTO: 0.1 % (ref 0–0.5)
KETONES UR QL STRIP.AUTO: NEGATIVE MG/DL
LEUKOCYTE ESTERASE UR QL STRIP.AUTO: NEGATIVE
LYMPHOCYTES # BLD: 2.39 K/UL (ref 0.8–3.5)
LYMPHOCYTES NFR BLD: 30.9 % (ref 12–49)
MCH RBC QN AUTO: 34.6 PG (ref 26–34)
MCHC RBC AUTO-ENTMCNC: 34.2 G/DL (ref 30–36.5)
MCV RBC AUTO: 101.3 FL (ref 80–99)
MONOCYTES # BLD: 0.79 K/UL (ref 0–1)
MONOCYTES NFR BLD: 10.2 % (ref 5–13)
NEUTS SEG # BLD: 4.37 K/UL (ref 1.8–8)
NEUTS SEG NFR BLD: 56.6 % (ref 32–75)
NITRITE UR QL STRIP.AUTO: NEGATIVE
PH UR STRIP: 5 (ref 5–8)
PLATELET # BLD AUTO: 137 K/UL (ref 150–400)
PMV BLD AUTO: 10 FL (ref 8.9–12.9)
POTASSIUM SERPL-SCNC: 4.4 MMOL/L (ref 3.5–5.1)
PROT SERPL-MCNC: 7.5 G/DL (ref 6.4–8.2)
PROT UR STRIP-MCNC: 100 MG/DL
RBC # BLD AUTO: 4.65 M/UL (ref 4.1–5.7)
RBC #/AREA URNS HPF: ABNORMAL /HPF (ref 0–5)
SODIUM SERPL-SCNC: 139 MMOL/L (ref 136–145)
SP GR UR REFRACTOMETRY: 1.01 (ref 1–1.03)
TSH SERPL DL<=0.05 MIU/L-ACNC: 0.53 UIU/ML (ref 0.36–3.74)
URINE CULTURE IF INDICATED: ABNORMAL
UROBILINOGEN UR QL STRIP.AUTO: 0.1 EU/DL (ref 0.2–1)
WBC # BLD AUTO: 7.7 K/UL (ref 4.1–11.1)
WBC URNS QL MICRO: ABNORMAL /HPF (ref 0–4)

## 2025-07-05 PROCEDURE — 80053 COMPREHEN METABOLIC PANEL: CPT

## 2025-07-05 PROCEDURE — 84443 ASSAY THYROID STIM HORMONE: CPT

## 2025-07-05 PROCEDURE — 85025 COMPLETE CBC W/AUTO DIFF WBC: CPT

## 2025-07-05 PROCEDURE — 36415 COLL VENOUS BLD VENIPUNCTURE: CPT

## 2025-07-05 PROCEDURE — 6370000000 HC RX 637 (ALT 250 FOR IP): Performed by: EMERGENCY MEDICINE

## 2025-07-05 PROCEDURE — 6360000004 HC RX CONTRAST MEDICATION: Performed by: EMERGENCY MEDICINE

## 2025-07-05 PROCEDURE — 70491 CT SOFT TISSUE NECK W/DYE: CPT

## 2025-07-05 PROCEDURE — 99285 EMERGENCY DEPT VISIT HI MDM: CPT

## 2025-07-05 PROCEDURE — 81001 URINALYSIS AUTO W/SCOPE: CPT

## 2025-07-05 RX ORDER — IOPAMIDOL 755 MG/ML
100 INJECTION, SOLUTION INTRAVASCULAR ONCE
Status: COMPLETED | OUTPATIENT
Start: 2025-07-05 | End: 2025-07-05

## 2025-07-05 RX ORDER — CEPHALEXIN 500 MG/1
500 CAPSULE ORAL ONCE
Status: COMPLETED | OUTPATIENT
Start: 2025-07-05 | End: 2025-07-05

## 2025-07-05 RX ORDER — CEPHALEXIN 500 MG/1
500 CAPSULE ORAL 3 TIMES DAILY
Qty: 21 CAPSULE | Refills: 0 | Status: SHIPPED | OUTPATIENT
Start: 2025-07-05 | End: 2025-07-12

## 2025-07-05 RX ADMIN — IOPAMIDOL 100 ML: 755 INJECTION, SOLUTION INTRAVENOUS at 12:51

## 2025-07-05 RX ADMIN — CEPHALEXIN 500 MG: 500 CAPSULE ORAL at 13:32

## 2025-07-05 ASSESSMENT — PAIN SCALES - GENERAL
PAINLEVEL_OUTOF10: 0
PAINLEVEL_OUTOF10: 5

## 2025-07-05 ASSESSMENT — PAIN - FUNCTIONAL ASSESSMENT
PAIN_FUNCTIONAL_ASSESSMENT: 0-10
PAIN_FUNCTIONAL_ASSESSMENT: 0-10

## 2025-07-05 ASSESSMENT — PAIN DESCRIPTION - DESCRIPTORS: DESCRIPTORS: ACHING

## 2025-07-05 ASSESSMENT — PAIN DESCRIPTION - LOCATION: LOCATION: JAW

## 2025-07-05 NOTE — ED PROVIDER NOTES
[facility] EMERGENCY DEPT  EMERGENCY DEPARTMENT HISTORY AND PHYSICAL EXAM      Date of evaluation: 7/5/2025  Patient Name: Delvis Galindo  Birthdate 1985  MRN: [id number]  ED Provider: Marti Prather MD   Note Started: 12:06 PM EDT 7/5/25    HISTORY OF PRESENT ILLNESS     Chief Complaint   Patient presents with    Fatigue    Cyst       History Provided By: Patient, only     HPI: Delvis Galindo is a 39 y.o. male with known history of seizure disorder but not on any medication presents with 1 week of fatigue without any other symptoms.  Patient denies chest pain, shortness of breath, cough, congestion, viral symptoms, nausea, vomiting, diarrhea or abdominal pain.  Patient has kept up with his fluids but has significantly decreased appetite.  He has not seen PCP in a long time.  He also presents with a 2-year history of swelling under his lower jaw that is not draining.    PAST MEDICAL HISTORY   Past Medical History:  Past Medical History:   Diagnosis Date    Seizures ([facility])        Past Surgical History:  Past Surgical History:   Procedure Laterality Date    NECK SURGERY N/A 7/10/2025    EXCISION OF LEFT ARM LIPOMA, INCISION AND DRAINAGE OF NECK ABSCESS performed by Parag José MD at [facility] MAIN OR       Family History:  History reviewed. No pertinent family history.    Social History:  Social History     Tobacco Use    Smoking status: Every Day     Current packs/day: 1.00     Types: Cigarettes    Smokeless tobacco: Never   Substance Use Topics    Alcohol use: Yes     Comment: weekends    Drug use: Yes     Types: Marijuana (Weed)       Allergies:  No Known Allergies    PCP: None, None    Current Meds:   No current facility-administered medications for this encounter.     Current Outpatient Medications   Medication Sig Dispense Refill    cephALEXin (KEFLEX) 500 MG capsule Take 1 capsule by mouth 3 times daily for 7 days 21 capsule 0    traMADol (ULTRAM) 50 MG tablet Take 1 tablet by mouth every 6 hours as  recorded       LAB, EKG AND DIAGNOSTIC RESULTS   Labs:  Recent Results (from the past 12 hours)   CBC with Auto Differential    Collection Time: 07/05/25 12:37 PM   Result Value Ref Range    WBC 7.7 4.1 - 11.1 K/uL    RBC 4.65 4.10 - 5.70 M/uL    Hemoglobin 16.1 12.1 - 17.0 g/dL    Hematocrit 47.1 36.6 - 50.3 %    .3 (H) 80.0 - 99.0 FL    MCH 34.6 (H) 26.0 - 34.0 PG    MCHC 34.2 30.0 - 36.5 g/dL    RDW 13.7 11.5 - 14.5 %    Platelets 137 (L) 150 - 400 K/uL    MPV 10.0 8.9 - 12.9 FL    Neutrophils % 56.6 32.0 - 75.0 %    Lymphocytes % 30.9 12.0 - 49.0 %    Monocytes % 10.2 5.0 - 13.0 %    Eosinophils % 1.7 0.0 - 7.0 %    Basophils % 0.5 0.0 - 1.0 %    Immature Granulocytes % 0.1 0.0 - 0.5 %    Neutrophils Absolute 4.37 1.80 - 8.00 K/UL    Lymphocytes Absolute 2.39 0.80 - 3.50 K/UL    Monocytes Absolute 0.79 0.00 - 1.00 K/UL    Eosinophils Absolute 0.13 0.00 - 0.40 K/UL    Basophils Absolute 0.04 0.00 - 0.10 K/UL    Immature Granulocytes Absolute 0.01 0.00 - 0.04 K/UL    Differential Type AUTOMATED     Comprehensive Metabolic Panel    Collection Time: 07/05/25 12:37 PM   Result Value Ref Range    Sodium 139 136 - 145 mmol/L    Potassium 4.4 3.5 - 5.1 mmol/L    Chloride 102 97 - 108 mmol/L    CO2 26 21 - 32 mmol/L    Anion Gap 11 2 - 12 mmol/L    Glucose 93 65 - 100 mg/dL    BUN 9 6 - 20 mg/dL    Creatinine 0.71 0.70 - 1.30 mg/dL    BUN/Creatinine Ratio 13 12 - 20      Est, Glom Filt Rate >90 >60 ml/min/1.73m2    Calcium 8.8 8.5 - 10.1 mg/dL    Total Bilirubin 0.6 0.2 - 1.0 mg/dL    AST 83 (H) 15 - 37 U/L    ALT 66 12 - 78 U/L    Alk Phosphatase 70 45 - 117 U/L    Total Protein 7.5 6.4 - 8.2 g/dL    Albumin 3.7 3.5 - 5.0 g/dL    Globulin 3.8 2.0 - 4.0 g/dL    Albumin/Globulin Ratio 1.0 (L) 1.1 - 2.2     Urinalysis with Reflex to Culture    Collection Time: 07/05/25 12:37 PM    Specimen: Urine   Result Value Ref Range    Color, UA Yellow/Straw      Appearance Clear Clear      Specific Gravity, UA 1.010 1.003 -

## 2025-07-05 NOTE — ED TRIAGE NOTES
Started 1 wk  CC:  Tired  Cyst under jaw left side, red swollen, tender x 3 days  Says it's been there for few years  Denies CP SOB,   No n, v, diarrhea  Eating but no appetite smokes weed to help.  Drinking fluids good.  No meds for symptoms

## 2025-07-05 NOTE — DISCHARGE INSTRUCTIONS
Thank you for choosing our Emergency Department for your care.  It is our privilege to care for you in your time of need.  In the next several days, you may receive a survey via email or mailed to your home about your experience with our team.  We would greatly appreciate you taking a few minutes to complete the survey, as we use this information to learn what we have done well and what we could be doing better. Thank you for trusting us with your care!    Below you will find a list of your tests from today's visit.   Labs and Radiology Studies  Recent Results (from the past 12 hours)   CBC with Auto Differential    Collection Time: 07/05/25 12:37 PM   Result Value Ref Range    WBC 7.7 4.1 - 11.1 K/uL    RBC 4.65 4.10 - 5.70 M/uL    Hemoglobin 16.1 12.1 - 17.0 g/dL    Hematocrit 47.1 36.6 - 50.3 %    .3 (H) 80.0 - 99.0 FL    MCH 34.6 (H) 26.0 - 34.0 PG    MCHC 34.2 30.0 - 36.5 g/dL    RDW 13.7 11.5 - 14.5 %    Platelets 137 (L) 150 - 400 K/uL    MPV 10.0 8.9 - 12.9 FL    Neutrophils % 56.6 32.0 - 75.0 %    Lymphocytes % 30.9 12.0 - 49.0 %    Monocytes % 10.2 5.0 - 13.0 %    Eosinophils % 1.7 0.0 - 7.0 %    Basophils % 0.5 0.0 - 1.0 %    Immature Granulocytes % 0.1 0.0 - 0.5 %    Neutrophils Absolute 4.37 1.80 - 8.00 K/UL    Lymphocytes Absolute 2.39 0.80 - 3.50 K/UL    Monocytes Absolute 0.79 0.00 - 1.00 K/UL    Eosinophils Absolute 0.13 0.00 - 0.40 K/UL    Basophils Absolute 0.04 0.00 - 0.10 K/UL    Immature Granulocytes Absolute 0.01 0.00 - 0.04 K/UL    Differential Type AUTOMATED     Comprehensive Metabolic Panel    Collection Time: 07/05/25 12:37 PM   Result Value Ref Range    Sodium 139 136 - 145 mmol/L    Potassium 4.4 3.5 - 5.1 mmol/L    Chloride 102 97 - 108 mmol/L    CO2 26 21 - 32 mmol/L    Anion Gap 11 2 - 12 mmol/L    Glucose 93 65 - 100 mg/dL    BUN 9 6 - 20 mg/dL    Creatinine 0.71 0.70 - 1.30 mg/dL    BUN/Creatinine Ratio 13 12 - 20      Est, Glom Filt Rate >90 >60 ml/min/1.73m2    Calcium  8.8 8.5 - 10.1 mg/dL    Total Bilirubin 0.6 0.2 - 1.0 mg/dL    AST 83 (H) 15 - 37 U/L    ALT 66 12 - 78 U/L    Alk Phosphatase 70 45 - 117 U/L    Total Protein 7.5 6.4 - 8.2 g/dL    Albumin 3.7 3.5 - 5.0 g/dL    Globulin 3.8 2.0 - 4.0 g/dL    Albumin/Globulin Ratio 1.0 (L) 1.1 - 2.2     Urinalysis with Reflex to Culture    Collection Time: 07/05/25 12:37 PM    Specimen: Urine   Result Value Ref Range    Color, UA Yellow/Straw      Appearance Clear Clear      Specific Gravity, UA 1.010 1.003 - 1.030      pH, Urine 5.0 5.0 - 8.0      Protein,  (A) Negative mg/dL    Glucose, Ur Negative Negative mg/dL    Ketones, Urine Negative Negative mg/dL    Bilirubin, Urine Negative Negative      Blood, Urine Small (A) Negative      Urobilinogen, Urine 0.1 (L) 0.2 - 1.0 EU/dL    Nitrite, Urine Negative Negative      Leukocyte Esterase, Urine Negative Negative      WBC, UA 0-4 0 - 4 /hpf    RBC, UA 0-5 0 - 5 /hpf    Epithelial Cells, UA Few Few /lpf    BACTERIA, URINE Negative Negative /hpf    Urine Culture if Indicated Culture not indicated by UA result Culture not indicated by UA result     TSH    Collection Time: 07/05/25 12:37 PM   Result Value Ref Range    TSH, 3rd Generation 0.53 0.36 - 3.74 uIU/mL     CT SOFT TISSUE NECK W CONTRAST  Result Date: 7/5/2025  INDICATION: golf ball size lesion (cyst vs abscess) under left mandible, fluctuant, tender COMPARISON: 2019 TECHNIQUE:  Axial CT neck following administration of IV contrast. Sagittal and coronal reformats were generated.  CT dose reduction was achieved through use of a standardized protocol tailored for this examination and automatic exposure control for dose modulation. CONTRAST: 100 mL Isovue 370 FINDINGS: NASOPHARYNX: Normal. SUPRAHYOID NECK: Normal oropharynx, oral cavity, parapharyngeal space, and retropharyngeal space. INFRAHYOID NECK: Normal larynx, hypopharynx and supraglottis. PAROTID GLANDS: Normal. SUBMANDIBULAR GLANDS: Normal. THYROID GLAND: Normal. No

## 2025-07-10 ENCOUNTER — ANESTHESIA (OUTPATIENT)
Facility: HOSPITAL | Age: 40
End: 2025-07-10
Payer: MEDICAID

## 2025-07-10 ENCOUNTER — ANESTHESIA EVENT (OUTPATIENT)
Facility: HOSPITAL | Age: 40
End: 2025-07-10
Payer: MEDICAID

## 2025-07-10 ENCOUNTER — HOSPITAL ENCOUNTER (OUTPATIENT)
Facility: HOSPITAL | Age: 40
Setting detail: OUTPATIENT SURGERY
Discharge: HOME | End: 2025-07-10
Attending: SURGERY | Admitting: SURGERY
Payer: MEDICAID

## 2025-07-10 VITALS
RESPIRATION RATE: 16 BRPM | OXYGEN SATURATION: 95 % | SYSTOLIC BLOOD PRESSURE: 135 MMHG | HEART RATE: 80 BPM | HEIGHT: 73 IN | DIASTOLIC BLOOD PRESSURE: 79 MMHG | BODY MASS INDEX: 25.45 KG/M2 | TEMPERATURE: 97.6 F | WEIGHT: 192 LBS

## 2025-07-10 DIAGNOSIS — D17.22 LIPOMA OF LEFT UPPER EXTREMITY: ICD-10-CM

## 2025-07-10 DIAGNOSIS — L02.11 ABSCESS, NECK: ICD-10-CM

## 2025-07-10 PROCEDURE — 3700000001 HC ADD 15 MINUTES (ANESTHESIA): Performed by: SURGERY

## 2025-07-10 PROCEDURE — 7100000011 HC PHASE II RECOVERY - ADDTL 15 MIN: Performed by: SURGERY

## 2025-07-10 PROCEDURE — 7100000001 HC PACU RECOVERY - ADDTL 15 MIN: Performed by: SURGERY

## 2025-07-10 PROCEDURE — 3600000012 HC SURGERY LEVEL 2 ADDTL 15MIN: Performed by: SURGERY

## 2025-07-10 PROCEDURE — 7100000010 HC PHASE II RECOVERY - FIRST 15 MIN: Performed by: SURGERY

## 2025-07-10 PROCEDURE — 7100000000 HC PACU RECOVERY - FIRST 15 MIN: Performed by: SURGERY

## 2025-07-10 PROCEDURE — 3600000002 HC SURGERY LEVEL 2 BASE: Performed by: SURGERY

## 2025-07-10 PROCEDURE — 88304 TISSUE EXAM BY PATHOLOGIST: CPT

## 2025-07-10 PROCEDURE — 2580000003 HC RX 258: Performed by: SURGERY

## 2025-07-10 PROCEDURE — 87102 FUNGUS ISOLATION CULTURE: CPT

## 2025-07-10 PROCEDURE — 87077 CULTURE AEROBIC IDENTIFY: CPT

## 2025-07-10 PROCEDURE — 87070 CULTURE OTHR SPECIMN AEROBIC: CPT

## 2025-07-10 PROCEDURE — 2500000003 HC RX 250 WO HCPCS: Performed by: SURGERY

## 2025-07-10 PROCEDURE — 3700000000 HC ANESTHESIA ATTENDED CARE: Performed by: SURGERY

## 2025-07-10 PROCEDURE — 87205 SMEAR GRAM STAIN: CPT

## 2025-07-10 PROCEDURE — 6360000002 HC RX W HCPCS: Performed by: SURGERY

## 2025-07-10 PROCEDURE — 87186 SC STD MICRODIL/AGAR DIL: CPT

## 2025-07-10 PROCEDURE — 2709999900 HC NON-CHARGEABLE SUPPLY: Performed by: SURGERY

## 2025-07-10 RX ORDER — GLUCAGON 1 MG/ML
1 KIT INJECTION PRN
OUTPATIENT
Start: 2025-07-10

## 2025-07-10 RX ORDER — TRAMADOL HYDROCHLORIDE 50 MG/1
50 TABLET ORAL EVERY 6 HOURS PRN
Qty: 12 TABLET | Refills: 0 | Status: SHIPPED | OUTPATIENT
Start: 2025-07-10 | End: 2025-07-13

## 2025-07-10 RX ORDER — FENTANYL CITRATE 50 UG/ML
INJECTION, SOLUTION INTRAMUSCULAR; INTRAVENOUS
Status: DISCONTINUED | OUTPATIENT
Start: 2025-07-10 | End: 2025-07-10 | Stop reason: SDUPTHER

## 2025-07-10 RX ORDER — LORAZEPAM 2 MG/ML
0.5 INJECTION INTRAMUSCULAR
OUTPATIENT
Start: 2025-07-10

## 2025-07-10 RX ORDER — HYDRALAZINE HYDROCHLORIDE 20 MG/ML
10 INJECTION INTRAMUSCULAR; INTRAVENOUS
OUTPATIENT
Start: 2025-07-10

## 2025-07-10 RX ORDER — PHENYLEPHRINE HCL IN 0.9% NACL 0.4MG/10ML
SYRINGE (ML) INTRAVENOUS
Status: DISCONTINUED | OUTPATIENT
Start: 2025-07-10 | End: 2025-07-10 | Stop reason: SDUPTHER

## 2025-07-10 RX ORDER — OXYCODONE HYDROCHLORIDE 5 MG/1
10 TABLET ORAL PRN
Refills: 0 | OUTPATIENT
Start: 2025-07-10 | End: 2025-07-10

## 2025-07-10 RX ORDER — MIDAZOLAM HYDROCHLORIDE 1 MG/ML
INJECTION, SOLUTION INTRAMUSCULAR; INTRAVENOUS
Status: DISCONTINUED | OUTPATIENT
Start: 2025-07-10 | End: 2025-07-10 | Stop reason: SDUPTHER

## 2025-07-10 RX ORDER — LABETALOL HYDROCHLORIDE 5 MG/ML
10 INJECTION, SOLUTION INTRAVENOUS
OUTPATIENT
Start: 2025-07-10

## 2025-07-10 RX ORDER — LIDOCAINE 4 G/G
1 PATCH TOPICAL AS NEEDED
OUTPATIENT
Start: 2025-07-10

## 2025-07-10 RX ORDER — SODIUM CHLORIDE, SODIUM LACTATE, POTASSIUM CHLORIDE, CALCIUM CHLORIDE 600; 310; 30; 20 MG/100ML; MG/100ML; MG/100ML; MG/100ML
INJECTION, SOLUTION INTRAVENOUS ONCE
OUTPATIENT
Start: 2025-07-10 | End: 2025-07-10

## 2025-07-10 RX ORDER — DEXTROSE MONOHYDRATE 100 MG/ML
INJECTION, SOLUTION INTRAVENOUS CONTINUOUS PRN
OUTPATIENT
Start: 2025-07-10

## 2025-07-10 RX ORDER — DEXMEDETOMIDINE HYDROCHLORIDE 100 UG/ML
INJECTION, SOLUTION INTRAVENOUS
Status: DISCONTINUED | OUTPATIENT
Start: 2025-07-10 | End: 2025-07-10 | Stop reason: SDUPTHER

## 2025-07-10 RX ORDER — SODIUM CHLORIDE 9 MG/ML
INJECTION, SOLUTION INTRAVENOUS PRN
OUTPATIENT
Start: 2025-07-10

## 2025-07-10 RX ORDER — BUPIVACAINE HCL/EPINEPHRINE 0.5-1:200K
VIAL (ML) INJECTION PRN
Status: DISCONTINUED | OUTPATIENT
Start: 2025-07-10 | End: 2025-07-10 | Stop reason: ALTCHOICE

## 2025-07-10 RX ORDER — IPRATROPIUM BROMIDE AND ALBUTEROL SULFATE 2.5; .5 MG/3ML; MG/3ML
1 SOLUTION RESPIRATORY (INHALATION)
OUTPATIENT
Start: 2025-07-10

## 2025-07-10 RX ORDER — DIPHENHYDRAMINE HYDROCHLORIDE 50 MG/ML
12.5 INJECTION, SOLUTION INTRAMUSCULAR; INTRAVENOUS
OUTPATIENT
Start: 2025-07-10

## 2025-07-10 RX ORDER — FENTANYL CITRATE 0.05 MG/ML
50 INJECTION, SOLUTION INTRAMUSCULAR; INTRAVENOUS EVERY 5 MIN PRN
Refills: 0 | OUTPATIENT
Start: 2025-07-10

## 2025-07-10 RX ORDER — METOCLOPRAMIDE HYDROCHLORIDE 5 MG/ML
10 INJECTION INTRAMUSCULAR; INTRAVENOUS
OUTPATIENT
Start: 2025-07-10

## 2025-07-10 RX ORDER — GLYCOPYRROLATE 0.2 MG/ML
INJECTION INTRAMUSCULAR; INTRAVENOUS
Status: DISCONTINUED | OUTPATIENT
Start: 2025-07-10 | End: 2025-07-10 | Stop reason: SDUPTHER

## 2025-07-10 RX ORDER — OXYCODONE HYDROCHLORIDE 5 MG/1
5 TABLET ORAL PRN
Refills: 0 | OUTPATIENT
Start: 2025-07-10 | End: 2025-07-10

## 2025-07-10 RX ORDER — LIDOCAINE HYDROCHLORIDE 20 MG/ML
INJECTION, SOLUTION EPIDURAL; INFILTRATION; INTRACAUDAL; PERINEURAL
Status: DISCONTINUED | OUTPATIENT
Start: 2025-07-10 | End: 2025-07-10 | Stop reason: SDUPTHER

## 2025-07-10 RX ORDER — HYDROMORPHONE HYDROCHLORIDE 1 MG/ML
0.5 INJECTION, SOLUTION INTRAMUSCULAR; INTRAVENOUS; SUBCUTANEOUS EVERY 5 MIN PRN
Refills: 0 | OUTPATIENT
Start: 2025-07-10

## 2025-07-10 RX ORDER — ONDANSETRON 2 MG/ML
4 INJECTION INTRAMUSCULAR; INTRAVENOUS
OUTPATIENT
Start: 2025-07-10

## 2025-07-10 RX ORDER — SODIUM CHLORIDE 0.9 % (FLUSH) 0.9 %
5-40 SYRINGE (ML) INJECTION PRN
OUTPATIENT
Start: 2025-07-10

## 2025-07-10 RX ORDER — SODIUM CHLORIDE, SODIUM LACTATE, POTASSIUM CHLORIDE, CALCIUM CHLORIDE 600; 310; 30; 20 MG/100ML; MG/100ML; MG/100ML; MG/100ML
INJECTION, SOLUTION INTRAVENOUS CONTINUOUS
Status: DISCONTINUED | OUTPATIENT
Start: 2025-07-10 | End: 2025-07-10 | Stop reason: HOSPADM

## 2025-07-10 RX ORDER — SODIUM CHLORIDE 0.9 % (FLUSH) 0.9 %
5-40 SYRINGE (ML) INJECTION EVERY 12 HOURS SCHEDULED
OUTPATIENT
Start: 2025-07-10

## 2025-07-10 RX ADMIN — DEXMEDETOMIDINE HYDROCHLORIDE 6 MCG: 100 INJECTION, SOLUTION INTRAVENOUS at 07:46

## 2025-07-10 RX ADMIN — FENTANYL CITRATE 50 MCG: 50 INJECTION, SOLUTION INTRAMUSCULAR; INTRAVENOUS at 08:48

## 2025-07-10 RX ADMIN — SODIUM CHLORIDE, POTASSIUM CHLORIDE, SODIUM LACTATE AND CALCIUM CHLORIDE: 600; 310; 30; 20 INJECTION, SOLUTION INTRAVENOUS at 07:29

## 2025-07-10 RX ADMIN — Medication 200 MCG: at 07:40

## 2025-07-10 RX ADMIN — Medication 200 MCG: at 07:49

## 2025-07-10 RX ADMIN — FENTANYL CITRATE 50 MCG: 50 INJECTION, SOLUTION INTRAMUSCULAR; INTRAVENOUS at 07:40

## 2025-07-10 RX ADMIN — Medication 200 MCG: at 08:33

## 2025-07-10 RX ADMIN — Medication 200 MCG: at 07:56

## 2025-07-10 RX ADMIN — Medication 200 MCG: at 08:04

## 2025-07-10 RX ADMIN — LIDOCAINE HYDROCHLORIDE 100 MG: 20 INJECTION, SOLUTION EPIDURAL; INFILTRATION; INTRACAUDAL; PERINEURAL at 07:40

## 2025-07-10 RX ADMIN — Medication 200 MCG: at 08:19

## 2025-07-10 RX ADMIN — GLYCOPYRROLATE 0.2 MG: 0.2 INJECTION INTRAMUSCULAR; INTRAVENOUS at 07:40

## 2025-07-10 RX ADMIN — Medication 200 MCG: at 08:11

## 2025-07-10 RX ADMIN — MIDAZOLAM HYDROCHLORIDE 2 MG: 1 INJECTION, SOLUTION INTRAMUSCULAR; INTRAVENOUS at 07:29

## 2025-07-10 RX ADMIN — CEFAZOLIN 2000 MG: 1 INJECTION, POWDER, FOR SOLUTION INTRAMUSCULAR; INTRAVENOUS at 07:44

## 2025-07-10 ASSESSMENT — PAIN - FUNCTIONAL ASSESSMENT
PAIN_FUNCTIONAL_ASSESSMENT: 0-10
PAIN_FUNCTIONAL_ASSESSMENT: FACE, LEGS, ACTIVITY, CRY, AND CONSOLABILITY (FLACC)
PAIN_FUNCTIONAL_ASSESSMENT: NONE - DENIES PAIN
PAIN_FUNCTIONAL_ASSESSMENT: FACE, LEGS, ACTIVITY, CRY, AND CONSOLABILITY (FLACC)
PAIN_FUNCTIONAL_ASSESSMENT: FACE, LEGS, ACTIVITY, CRY, AND CONSOLABILITY (FLACC)

## 2025-07-10 ASSESSMENT — LIFESTYLE VARIABLES: SMOKING_STATUS: 1

## 2025-07-10 ASSESSMENT — PAIN SCALES - GENERAL
PAINLEVEL_OUTOF10: 0
PAINLEVEL_OUTOF10: 0

## 2025-07-10 NOTE — DISCHARGE INSTRUCTIONS
No strenuous activity or lifting weight more than 10 pounds for next 2 weeks.      Remove the neck wound packing after 2 days and keep the wound covered with gauze and tape.    Patient can shower daily.

## 2025-07-10 NOTE — ANESTHESIA PRE PROCEDURE
Department of Anesthesiology  Preprocedure Note       Name:  Delvis Galindo   Age:  39 y.o.  :  1985                                          MRN:  [id number]         Date:  7/10/2025      Surgeon: Surgeon(s):  Parag José MD    Procedure: Procedure(s):  EXCISION OF LEFT ARM LIPOMA, INCISION AND DRAINAGE OF NECK ABSCESS    Medications prior to admission:   Prior to Admission medications    Medication Sig Start Date End Date Taking? Authorizing Provider   cephALEXin (KEFLEX) 500 MG capsule Take 1 capsule by mouth 3 times daily for 7 days 25 Yes Marti Prather MD   levETIRAcetam (KEPPRA) 500 MG tablet Take 1 tablet by mouth 2 times daily  Patient not taking: Reported on 7/10/2025 5/7/24 6/6/24  Wallace Harrington DO       Current medications:    Current Facility-Administered Medications   Medication Dose Route Frequency Provider Last Rate Last Admin   • ceFAZolin (ANCEF) 2,000 mg in sterile water 20 mL IV syringe  2,000 mg IntraVENous Once Parag José MD       • lactated ringers infusion   IntraVENous Continuous Parag José MD           Allergies:  No Known Allergies    Problem List:  There is no problem list on file for this patient.      Past Medical History:        Diagnosis Date   • Seizures (HCC)        Past Surgical History:  History reviewed. No pertinent surgical history.    Social History:    Social History     Tobacco Use   • Smoking status: Every Day     Current packs/day: 1.00     Types: Cigarettes   • Smokeless tobacco: Never   Substance Use Topics   • Alcohol use: Yes     Comment: weekends                                Ready to quit: Not Answered  Counseling given: Not Answered      Vital Signs (Current):   Vitals:    07/10/25 0630   BP: (!) 137/90   Pulse: 74   Resp: 18   Temp: 98.2 °F (36.8 °C)   TempSrc: Oral   SpO2: 96%   Weight: 87.1 kg (192 lb)   Height: 1.854 m (6' 1\")                                              BP Readings from Last 3  Encounters:   07/10/25 (!) 137/90   07/05/25 (!) 157/91   05/07/24 (!) 141/91       NPO Status: Time of last liquid consumption: 2330                        Time of last solid consumption: 2330                        Date of last liquid consumption: 07/09/25                        Date of last solid food consumption: 07/09/25    BMI:   Wt Readings from Last 3 Encounters:   07/10/25 87.1 kg (192 lb)   07/05/25 90.7 kg (200 lb)   05/07/24 104.3 kg (230 lb)     Body mass index is 25.33 kg/m².    CBC:   Lab Results   Component Value Date/Time    WBC 7.7 07/05/2025 12:37 PM    RBC 4.65 07/05/2025 12:37 PM    HGB 16.1 07/05/2025 12:37 PM    HCT 47.1 07/05/2025 12:37 PM    .3 07/05/2025 12:37 PM    RDW 13.7 07/05/2025 12:37 PM     07/05/2025 12:37 PM       CMP:   Lab Results   Component Value Date/Time     07/05/2025 12:37 PM    K 4.4 07/05/2025 12:37 PM     07/05/2025 12:37 PM    CO2 26 07/05/2025 12:37 PM    BUN 9 07/05/2025 12:37 PM    CREATININE 0.71 07/05/2025 12:37 PM    AGRATIO 1.0 04/13/2023 01:20 PM    LABGLOM >90 07/05/2025 12:37 PM    LABGLOM >60 04/13/2023 01:20 PM    GLUCOSE 93 07/05/2025 12:37 PM    CALCIUM 8.8 07/05/2025 12:37 PM    BILITOT 0.6 07/05/2025 12:37 PM    ALKPHOS 70 07/05/2025 12:37 PM    ALKPHOS 89 04/13/2023 01:20 PM    AST 83 07/05/2025 12:37 PM    ALT 66 07/05/2025 12:37 PM       POC Tests: No results for input(s): \"POCGLU\", \"POCNA\", \"POCK\", \"POCCL\", \"POCBUN\", \"POCHEMO\", \"POCHCT\" in the last 72 hours.    Coags: No results found for: \"PROTIME\", \"INR\", \"APTT\"    HCG (If Applicable): No results found for: \"PREGTESTUR\", \"PREGSERUM\", \"HCG\", \"HCGQUANT\"     ABGs: No results found for: \"PHART\", \"PO2ART\", \"JYN0GXL\", \"QFA7GCU\", \"BEART\", \"E0JUKMHQ\"     Type & Screen (If Applicable):  No results found for: \"ABORH\", \"LABANTI\"    Drug/Infectious Status (If Applicable):  No results found for: \"HIV\", \"HEPCAB\"    COVID-19 Screening (If Applicable): No results found for:

## 2025-07-10 NOTE — OP NOTE
Operative Note      Patient: Delvis Galindo  YOB: 1985  MRN: [id number]    Date of Procedure: 7/10/2025    Pre-Op Diagnosis Codes:      * Abscess, neck [L02.11]     * Lipoma of left upper extremity [D17.22]    Post-Op Diagnosis:  Left arm large sebaceous cyst 3.5 cm.  Neck/submental abscess        Procedure:  Excision of left arm sebaceous cyst 3.5 cm    Complex incision and drainage of anterior neck/submental abscess with packing    Surgeon(s):  Parag José MD    Assistant:  Ms. Gaby Miramontes    Anesthesia: General/local    Estimated Blood Loss (mL): Minimal    Complications: None    Specimens:   ID Type Source Tests Collected by Time Destination   1 : LEFT ARM SEBACEOUS CYST Tissue Cyst SURGICAL PATHOLOGY Parag José MD 7/10/2025 0825    A : SUBMENTAL NECK ABSCESS Tissue Neck CULTURE, FUNGUS, CULTURE, TISSUE (WITH GRAM STAIN) Parag José MD 7/10/2025 0847    B : SUBMENTAL NECK ABSCESS Swab Neck CULTURE, FUNGUS, CULTURE, WOUND (WITH GRAM STAIN) Parag José MD 7/10/2025 0849        Implants:  * No implants in log *      Drains: * No LDAs found *    Findings:  Infection Present At Time Of Surgery (PATOS) (choose all levels that have infection present):  - Superficial Infection (skin/subcutaneous) present as evidenced by pus  Other Findings: As above      Electronically signed by Parag José MD on 7/10/2025 at 9:01 AM

## 2025-07-10 NOTE — PERIOP NOTE
Discharge instructions printed and provided to patient and friend - Alan with all questions answered in full. PIV x1 removed with cath tip intact. Pt VSS and no signs of distress noted. Pt tolerating liquids with no issues. Pt ambulating within room with no issues. Pt wheeled down to main entrance accompanied by staff. Pt condition stable at time of discharge.      Dressing to neck clean, dry, and intact and skin glue to LUE clean, dry, and intact upon discharge. Pt discharged with SSI kit and incentive spirometer and instructed on use. Pt verbalized understanding of all information provided.     Appointment attempted to be made. LVM with office to call back patient to schedule follow up. Patient aware of this information.

## 2025-07-10 NOTE — H&P
[facility] SURGERY H&P           Chief Complaint: Anterior neck pain    History of Present Illness:    Mr. Delvis Galindo is a 39 y.o. male presents to same-day surgery for an incision and drainage of anterior neck/submental abscess and excision of left arm lipoma..      Past Medical History:   Past Medical History:   Diagnosis Date    Seizures (HCC)        Past Surgical History: History reviewed. No pertinent surgical history.     Allergy:No Known Allergies    Social History:  reports that he has been smoking cigarettes. He has never used smokeless tobacco. He reports current alcohol use. He reports current drug use. Drug: Marijuana (Weed).     Family History:History reviewed. No pertinent family history.     Current Medications:  Current Facility-Administered Medications:     ceFAZolin (ANCEF) 2,000 mg in sterile water 20 mL IV syringe, 2,000 mg, IntraVENous, Once, Parag José MD    lactated ringers infusion, , IntraVENous, Continuous, Parag José MD     Immunization History:   Most Recent Immunizations   Administered Date(s) Administered    TD 2LF, TDVAX, (age 7y+), IM, 0.5mL 11/06/2023      Complete    Review of Systems:     Constitutional:  no fever,  no chills,  no sweats, No weakness, No fatigue, No decreased activity.  Eye: No recent visual problem, No icterus, No discharge, No double vision.  Ear/Nose/Mouth/Throat: No decreased hearing, No ear pain, No nasal congestion, No sore throat.  Respiratory: No shortness of breath, No cough, No sputum production, No hemoptysis, No wheezing, No cyanosis.  Cardiovascular: No chest pain, No palpitations, No bradycardia, No tachycardia, No peripheral edema, No syncope.  Gastrointestinal: No nausea,  No vomiting, No diarrhea, No constipation, No heartburn,  No abdominal pain.  Genitourinary: No dysuria, No hematuria, No change in urine stream, No urethral discharge, No lesions.  Hematology/Lymphatics: No bruising tendency, No bleeding tendency, No

## 2025-07-13 LAB
BACTERIA SPEC CULT: ABNORMAL
BACTERIA SPEC CULT: ABNORMAL
GRAM STN SPEC: ABNORMAL
GRAM STN SPEC: ABNORMAL
Lab: ABNORMAL

## 2025-07-14 LAB
BACTERIA SPEC CULT: ABNORMAL
BACTERIA SPEC CULT: ABNORMAL
BACTERIA SPEC CULT: NORMAL
GRAM STN SPEC: ABNORMAL
GRAM STN SPEC: ABNORMAL
Lab: ABNORMAL
Lab: NORMAL

## 2025-07-21 LAB
BACTERIA SPEC CULT: NORMAL
Lab: NORMAL

## 2025-07-28 LAB
BACTERIA SPEC CULT: NORMAL
Lab: NORMAL

## 2025-08-04 LAB
BACTERIA SPEC CULT: NORMAL
Lab: NORMAL

## 2025-08-11 LAB
BACTERIA SPEC CULT: NORMAL
Lab: NORMAL

## (undated) DEVICE — BLADE,CARBON-STEEL,15,STRL,DISPOSABLE,TB: Brand: MEDLINE

## (undated) DEVICE — HYPODERMIC SAFETY NEEDLE: Brand: MONOJECT

## (undated) DEVICE — SYRINGE IRRIG 60ML SFT PLIABLE BLB EZ TO GRP 1 HND USE W/

## (undated) DEVICE — GLOVE SURG SZ 7 L12IN FNGR THK79MIL GRN LTX FREE

## (undated) DEVICE — MINOR GENERAL: Brand: MEDLINE INDUSTRIES, INC.

## (undated) DEVICE — WET SKIN PREP TRAY: Brand: MEDLINE INDUSTRIES, INC.

## (undated) DEVICE — GLOVE ORANGE PI 7   MSG9070

## (undated) DEVICE — NEPTUNE E-SEP SMOKE EVACUATION PENCIL, COATED, 70MM BLADE, PUSH BUTTON SWITCH: Brand: NEPTUNE E-SEP

## (undated) DEVICE — SYRINGE MED 10ML LUERLOCK TIP W/O SFTY DISP

## (undated) DEVICE — GAUZE,SPONGE,4"X4",16PLY,XRAY,STRL,LF: Brand: MEDLINE

## (undated) DEVICE — HEAD DONUT FOAM POSITIONER: Brand: CARDINAL HEALTH

## (undated) DEVICE — SOLUTION IV 500ML 0.9% SOD BOTTLE CHL LTWT DURABLE SHATTERPROOF